# Patient Record
Sex: FEMALE | Race: WHITE | Employment: OTHER | URBAN - METROPOLITAN AREA
[De-identification: names, ages, dates, MRNs, and addresses within clinical notes are randomized per-mention and may not be internally consistent; named-entity substitution may affect disease eponyms.]

---

## 2023-01-03 ENCOUNTER — HOSPITAL ENCOUNTER (OUTPATIENT)
Dept: WOUND CARE | Age: 83
Discharge: HOME | End: 2023-01-03
Attending: PODIATRIST
Payer: MEDICARE

## 2023-01-03 VITALS
SYSTOLIC BLOOD PRESSURE: 147 MMHG | RESPIRATION RATE: 18 BRPM | DIASTOLIC BLOOD PRESSURE: 66 MMHG | HEART RATE: 65 BPM | TEMPERATURE: 98.3 F | OXYGEN SATURATION: 95 %

## 2023-01-03 PROBLEM — L97.929 IDIOPATHIC CHRONIC VENOUS HYPERTENSION OF LEFT LOWER EXTREMITY WITH ULCER (HCC): Status: ACTIVE | Noted: 2023-01-03

## 2023-01-03 PROBLEM — D04.72: Status: ACTIVE | Noted: 2023-01-03

## 2023-01-03 PROBLEM — I87.312 IDIOPATHIC CHRONIC VENOUS HYPERTENSION OF LEFT LOWER EXTREMITY WITH ULCER (HCC): Status: ACTIVE | Noted: 2023-01-03

## 2023-01-03 PROBLEM — T81.31XA WOUND DISRUPTION, POST-OP, SKIN, INITIAL ENCOUNTER: Status: ACTIVE | Noted: 2023-01-03

## 2023-01-03 PROCEDURE — 87075 CULTR BACTERIA EXCEPT BLOOD: CPT

## 2023-01-03 PROCEDURE — 11042 DBRDMT SUBQ TIS 1ST 20SQCM/<: CPT

## 2023-01-03 PROCEDURE — 87186 SC STD MICRODIL/AGAR DIL: CPT

## 2023-01-03 PROCEDURE — 87205 SMEAR GRAM STAIN: CPT

## 2023-01-03 RX ORDER — BACITRACIN ZINC AND POLYMYXIN B SULFATE 500; 1000 [USP'U]/G; [USP'U]/G
OINTMENT TOPICAL ONCE
OUTPATIENT
Start: 2023-01-03 | End: 2023-01-03

## 2023-01-03 RX ORDER — MUPIROCIN 20 MG/G
OINTMENT TOPICAL ONCE
OUTPATIENT
Start: 2023-01-03 | End: 2023-01-03

## 2023-01-03 RX ORDER — LIDOCAINE HYDROCHLORIDE 40 MG/ML
SOLUTION TOPICAL ONCE
OUTPATIENT
Start: 2023-01-03 | End: 2023-01-03

## 2023-01-03 RX ORDER — LIDOCAINE HYDROCHLORIDE 20 MG/ML
JELLY TOPICAL ONCE
OUTPATIENT
Start: 2023-01-03 | End: 2023-01-03

## 2023-01-03 RX ORDER — LIDOCAINE 50 MG/G
OINTMENT TOPICAL ONCE
OUTPATIENT
Start: 2023-01-03 | End: 2023-01-03

## 2023-01-03 RX ORDER — CLOBETASOL PROPIONATE 0.5 MG/G
OINTMENT TOPICAL ONCE
OUTPATIENT
Start: 2023-01-03 | End: 2023-01-03

## 2023-01-03 RX ORDER — LIDOCAINE 40 MG/G
CREAM TOPICAL ONCE
OUTPATIENT
Start: 2023-01-03 | End: 2023-01-03

## 2023-01-03 NOTE — PROGRESS NOTES
[address] [facility]  Initial Consult note    Subjective:     Chief Complaint:  Too Harley is a 79 y.o.  female who presents with Lt. foot dorsal fore foot wound of over three weeks duration. Referred by:  Dr. Mathew Lara, [facility] Dermatology Mohs Surgeon    HPI:   had Mohs' surgery done to remove Squamous cell CA on the 12/12/22  Wound caused by: non healing surgical wound  Current wound care:  Offloading wound: yes and n/a  Appetite: good  Wound associated pain: moderate  Diabetic: no  Smoker: no  ROS: no N/V, no T/chills; no local rash  No past medical history on file. No past surgical history on file. No family history on file. Social History     Tobacco Use    Smoking status: Not on file    Smokeless tobacco: Not on file   Substance Use Topics    Alcohol use: Not on file       Prior to Admission medications    Not on File     Not on File     Review of Systems  Gen: No fever, chills, malaise, weight loss/gain. Derm: see above   Musc/skeletal: no bone or joint complains. Vasc: No edema, cyanosis or claudication. Endo: No heat/cold intolerance, no polyuria,polydipsia or polyphagia. HgbA1c:  Advance Care plan:     Counseling re nutrition not done. Current meds documented in chart  Pain:     Counseling re smoking cessation not done. Blood pressure: noted below     Review of Systems:  Pertinent items are noted in the History of Present Illness. Objective:     Physical Exam:     There were no vitals taken for this visit. General: well developed, well nourished, pleasant , NAD. Hygiene good  Psych: cooperative. Pleasant. No anxiety or depression. Normal mood and affect. Neuro: alert and oriented to person/place/situation. Otherwise nonfocal.  Derm: Skin turgor for age, dry skin  Lower extremities: color normal; temperature normal.Hair growth is not present. Calves are supple, nontender, approximately equally sized in comparison.   Capillary refill <3 sec  Focussed Lower Extremity Exam:  Vascular exam & Nails dystrophic:  Left lower extremity: moderate  edema, foot moderate,   DP pulse : Left, 2+  PT pulse: Left, 1+    Ulcer Location: Lt. foot dorsal forefoot   Etiology: non healing surgical wound  Wound Length:    Wound Width :   Wound Depth :   Ulcer bed: Mixed Granular/Fibrotic  Fibrotic slough  Pink Granulation  Visable Slough  Visible Fat  Visible Fibrin mild infection   Periwound: Reddened, Indurated , Intact   Exudate: Moderate amount Serosanguinous exudate  Depth of Wound: To Fat Layer     Data Review:   No results found for this or any previous visit (from the past 24 hour(s)). Assessment:     Patient Active Problem List   Diagnosis Code    Wound disruption, post-op, skin, initial encounter T81.31XA     79 y.o. female with non healing surgical wound with localized infection grade 2 wound, has been treated with oral abx by Dr. Cecy Morris just finished taking does not remember the name of the abx will tell us next visit    Needs :  Serial debridement- debrided today- see note below  Good local wound care  Edema management  Nutrition optimization  Good Diabetic control    Plan:     In wound care clinic today:  Cleanse wound with NS or soap and water or commercial wound cleanser  Apply the following topically to wound bed:  Apply the following to anurag-wound: NA  Apply the following dressings: Absorptive dressing    For Home Care/Self Care:  Cleanse wound with NS or soap and water or commercial wound cleanser  Keep dressing dry and intact when bathing  Apply the following to wound bed:  Apply the following to skin around wound: NA  Apply the following dressings: Absorptive dressing    Leave dressings in place until next visit    Edema Control:   Elevate legs as much as possible. Avoid standing in one position for more than 10 minutes. Avoid setting with legs down. Do not cross legs while sitting. Off-Loading:     Frequent position changes. Do not cross legs while sitting.  Shift weight every 20 minutes or more when sitting for prolonged periods of time. Patient to return for wound care in:   Days  Follow up with Nurse visit as recommended. PLEASE CONTACT OFFICE AS SOON AS POSSIBLE IF UNABLE TO MAKE THIS APPOINTMENT. Inspect your wounds, looking for signs of infection which may include the following:  Increase in redness  Red \"streaks\" from wound  Increase in swelling  Fever  Unusual odor  Change in the amount of wound drainage. Should you experience any significant changes in your wound(s) or have any questions regarding your home care instructions please contact the wound center or your home health company. If after regular business hours, please call your family doctor or local emergency room. Patient understood and agrees with plan. Questions answered. Signed By: Cele Kovacs DPM     January 3, 2023        Debridement Wound Care        Problem List Items Addressed This Visit    None      Procedure Note  Indications:  Based on my examination of this patient's wound(s)/ulcer(s) today, debridement is required to promote healing and evaluate the wound base. Performed by: Cele Kovacs DPM    Consent obtained: Yes    Time out taken: Yes    Debridement: Excisional    Using curette the wound(s)/ulcer(s) was/were sharply debrided down through and including the removal of    epidermis, dermis, and subcutaneous tissue    Devitalized Tissue Debrided: fibrin, biofilm, slough, and exudate    Pre Debridement Measurements:  Are located in the [facility]  Documentation Flow Sheet    Other: non healing surgical wound    Wound/Ulcer #: 1    Post Debridement Measurements:  Wound/Ulcer Descriptions are Pre Debridement except measurements:           Total Surface Area Debrided:  11 sq cm     Estimated Blood Loss:  Minimal     Hemostasis Achieved: Pressure    Procedural Pain: 0 / 10     Post Procedural Pain: 0 / 10     Response to treatment: Well tolerated by patient     Will treat with compression treatment and obtained c&s to start abx treatment also will get Cathy ba PA

## 2023-01-03 NOTE — WOUND CARE
01/03/23 1659   Wound Foot Left;Dorsal S/P Moh's Procedure 01/03/23   Date First Assessed/Time First Assessed: 01/03/23 1658   Present on Hospital Admission: Yes  Wound Approximate Age at First Assessment (Weeks): 3 weeks  Primary Wound Type: Closed incision/surgical site  Location: Foot  Wound Location Orientation: Lef. .. Wound Image     Wound Etiology Surgical   Dressing Status New dressing applied   Cleansed Wound cleanser   Dressing/Treatment Alginate with Ag;Collagen;Dry dressing;Roll gauze  (2 layer wrap)   Dressing Change Due 01/10/23   Wound Length (cm) 2.1 cm   Wound Width (cm) 5.2 cm   Wound Depth (cm) 0.1 cm   Wound Surface Area (cm^2) 10.92 cm^2   Wound Volume (cm^3) 1.092 cm^3   Post-Procedure Length (cm) 2.2 cm   Post-Procedure Width (cm) 5.2 cm   Post-Procedure Depth (cm) 0.3 cm   Post-Procedure Surface Area (cm^2) 11.44 cm^2   Post-Procedure Volume (cm^3) 3.432 cm^3   Wound Assessment Devitalized tissue   Drainage Amount Scant   Drainage Description Serosanguinous   Wound Odor None   Radha-Wound/Incision Assessment Intact   Edges Defined edges   Wound Thickness Description Full thickness     Multilayer Compression Wrap   (Not Unna) Below the Knee    NAME:  Carmen Oconnor OF BIRTH:  1940  MEDICAL RECORD NUMBER:  [id number]  DATE:  1/3/2023    Removed old Multilayer wrap if indicated and wash leg with mild soap/water. Applied moisturizing agent to dry skin as needed. Applied primary and secondary dressing as ordered. Applied multilayered dressing below the knee to left lower leg. Instructed patient/caregiver not to remove dressing and to keep it clean and dry. Instructed patient/caregiver on complications to report to provider, such as pain, numbness in toes, heavy drainage, and slippage of dressing.     Response to treatment: Well tolerated by patient       Electronically signed by Gareth Cornejo RN on 1/3/2023 at [address] PM

## 2023-01-05 LAB
BACTERIA SPEC CULT: NORMAL
SERVICE CMNT-IMP: NORMAL

## 2023-01-08 LAB
BACTERIA SPEC CULT: ABNORMAL
BACTERIA SPEC CULT: ABNORMAL
GRAM STN SPEC: ABNORMAL
GRAM STN SPEC: ABNORMAL
SERVICE CMNT-IMP: ABNORMAL

## 2023-01-10 ENCOUNTER — HOSPITAL ENCOUNTER (OUTPATIENT)
Dept: WOUND CARE | Age: 83
Discharge: HOME | End: 2023-01-10
Attending: PODIATRIST
Payer: MEDICARE

## 2023-01-10 VITALS
SYSTOLIC BLOOD PRESSURE: 155 MMHG | OXYGEN SATURATION: 98 % | RESPIRATION RATE: 18 BRPM | DIASTOLIC BLOOD PRESSURE: 72 MMHG | TEMPERATURE: 97.4 F | HEART RATE: 77 BPM

## 2023-01-10 DIAGNOSIS — T81.31XA WOUND DISRUPTION, POST-OP, SKIN, INITIAL ENCOUNTER: Primary | ICD-10-CM

## 2023-01-10 DIAGNOSIS — I87.312 IDIOPATHIC CHRONIC VENOUS HYPERTENSION OF LEFT LOWER EXTREMITY WITH ULCER (HCC): ICD-10-CM

## 2023-01-10 DIAGNOSIS — L97.929 IDIOPATHIC CHRONIC VENOUS HYPERTENSION OF LEFT LOWER EXTREMITY WITH ULCER (HCC): ICD-10-CM

## 2023-01-10 PROCEDURE — 11042 DBRDMT SUBQ TIS 1ST 20SQCM/<: CPT

## 2023-01-10 RX ORDER — LIDOCAINE HYDROCHLORIDE 20 MG/ML
JELLY TOPICAL ONCE
OUTPATIENT
Start: 2023-01-10 | End: 2023-01-10

## 2023-01-10 RX ORDER — LIDOCAINE 50 MG/G
OINTMENT TOPICAL ONCE
OUTPATIENT
Start: 2023-01-10 | End: 2023-01-10

## 2023-01-10 RX ORDER — LIDOCAINE HYDROCHLORIDE 40 MG/ML
SOLUTION TOPICAL ONCE
OUTPATIENT
Start: 2023-01-10 | End: 2023-01-10

## 2023-01-10 RX ORDER — LIDOCAINE HYDROCHLORIDE 20 MG/ML
JELLY TOPICAL ONCE
Status: DISCONTINUED | OUTPATIENT
Start: 2023-01-10 | End: 2023-01-11 | Stop reason: HOSPADM

## 2023-01-10 RX ORDER — LIDOCAINE 40 MG/G
CREAM TOPICAL ONCE
OUTPATIENT
Start: 2023-01-10 | End: 2023-01-10

## 2023-01-10 RX ORDER — MUPIROCIN 20 MG/G
OINTMENT TOPICAL ONCE
OUTPATIENT
Start: 2023-01-10 | End: 2023-01-10

## 2023-01-10 RX ORDER — CLOBETASOL PROPIONATE 0.5 MG/G
OINTMENT TOPICAL ONCE
OUTPATIENT
Start: 2023-01-10 | End: 2023-01-10

## 2023-01-10 RX ORDER — BACITRACIN ZINC AND POLYMYXIN B SULFATE 500; 1000 [USP'U]/G; [USP'U]/G
OINTMENT TOPICAL ONCE
OUTPATIENT
Start: 2023-01-10 | End: 2023-01-10

## 2023-01-10 NOTE — PROGRESS NOTES
Debridement Wound Care   Reviewed the c&s report and appreciated clinically no acute infection possible contaminant or surface colonization will defer oral treatment due to the abx choices present which have can have serious side effects at her age, will treat with topical Gentamicin cream for now and will reevaluate next week patient will bring her current meds she is taking if worsens will consider the oral treatment     Problem List Items Addressed This Visit    None      No orders of the defined types were placed in this encounter. Procedure Note  Indications:  Based on my examination of this patient's wound(s)/ulcer(s) today, debridement is required to promote healing and evaluate the wound base.     Performed by: Ancelmo Iqbal DPM    Consent obtained: Yes    Time out taken: Yes    Debridement: Excisional    Using  Curette the wound(s)/ulcer(s) was/were sharply debrided down through and including the removal of    epidermis, dermis, and subcutaneous tissue    Devitalized Tissue Debrided: fibrin, biofilm, and slough    Pre Debridement Measurements:  Are located in the [place]  Documentation Flow Sheet    Wound/Ulcer #: 1    Post Debridement Measurements:  Wound/Ulcer Descriptions are Pre Debridement except measurements: Post-op ulcer    Wound Foot Left;Dorsal S/P Moh's Procedure 01/03/23 (Active)   Wound Image    01/10/23 1440   Wound Etiology Surgical 01/10/23 1440   Dressing Status New dressing applied 01/03/23 1659   Cleansed Wound cleanser 01/03/23 1659   Dressing/Treatment Alginate with Ag;Collagen;Dry dressing;Roll gauze 01/03/23 1659   Dressing Change Due 01/10/23 01/03/23 1659   Wound Length (cm) 2.1 cm 01/10/23 1440   Wound Width (cm) 4.7 cm 01/10/23 1440   Wound Depth (cm) 0.1 cm 01/10/23 1440   Wound Surface Area (cm^2) 9.87 cm^2 01/10/23 1440   Change in Wound Size % 9.62 01/10/23 1440   Wound Volume (cm^3) 0.987 cm^3 01/10/23 1440   Wound Healing % 10 01/10/23 1440   Post-Procedure Length (cm) 2.2 cm 01/03/23 1659   Post-Procedure Width (cm) 5.2 cm 01/03/23 1659   Post-Procedure Depth (cm) 0.3 cm 01/03/23 1659   Post-Procedure Surface Area (cm^2) 11.44 cm^2 01/03/23 1659   Post-Procedure Volume (cm^3) 3.432 cm^3 01/03/23 1659   Wound Assessment Devitalized tissue 01/03/23 1659   Drainage Amount Scant 01/03/23 1659   Drainage Description Serosanguinous 01/03/23 1659   Wound Odor None 01/03/23 1659   Radha-Wound/Incision Assessment Intact 01/03/23 1659   Edges Defined edges 01/03/23 1659   Wound Thickness Description Full thickness 01/03/23 1659   Number of days: 7        Percent of Wound(s)/Ulcer(s) Debrided: 100%    Total Surface Area Debrided:  Approx 10 sq cm See RN's note    Diabetic/Pressure/Non Pressure Ulcers only: Post-op ulcer  Ulcer:     Estimated Blood Loss:  Estimated amt of blood loss is 10 ml    Hemostasis Achieved: Pressure    Procedural Pain: 0 / 10     Post Procedural Pain: 0 / 10     Response to treatment: Well tolerated by patient

## 2023-01-10 NOTE — WOUND CARE
01/10/23 1440   Wound Foot Left;Dorsal S/P Moh's Procedure 01/03/23   Date First Assessed/Time First Assessed: 01/03/23 1658   Present on Hospital Admission: Yes  Wound Approximate Age at First Assessment (Weeks): 3 weeks  Primary Wound Type: Closed incision/surgical site  Location: Foot  Wound Location Orientation: Lef. .. Wound Image     Wound Etiology Surgical   Dressing Status Intact   Cleansed Wound cleanser   Dressing/Treatment Alginate;Dry dressing;Collagen;Roll gauze; Tubular bandage   Dressing Change Due 01/17/23   Wound Length (cm) 2.1 cm   Wound Width (cm) 4.7 cm   Wound Depth (cm) 0.1 cm   Wound Surface Area (cm^2) 9.87 cm^2   Change in Wound Size % 9.62   Wound Volume (cm^3) 0.987 cm^3   Wound Healing % 10   Post-Procedure Length (cm) 2.2 cm   Post-Procedure Width (cm) 4.8 cm   Post-Procedure Depth (cm) 0.2 cm   Post-Procedure Surface Area (cm^2) 10.56 cm^2   Post-Procedure Volume (cm^3) 2.112 cm^3   Wound Assessment Devitalized tissue   Drainage Amount Small   Drainage Description Serosanguinous   Wound Odor None   Radha-Wound/Incision Assessment Intact   Edges Defined edges   Wound Thickness Description Full thickness

## 2023-01-11 NOTE — DISCHARGE INSTRUCTIONS
Discharge Instructions from  [address] [facility]  [address] [place], [facility]  [phone] Fax [phone]    NAME:  Edith Kulkarni  YOB: 1940  MEDICAL RECORD NUMBER:  [id number]  DATE:  1/10/2023    Wound Cleansing:   Do not scrub or use excessive force. Cleanse wound prior to applying a clean dressing with:  [] Normal Saline [] Keep Wound Dry in Shower    [] Wound Cleanser   [] Cleanse wound with Mild Soap & Water  [] May Shower at Discharge   [] Other:      Topical Treatments:  Do not apply lotions, creams, or ointments to wound bed unless directed. [] Apply moisturizing lotion to skin surrounding the wound prior to dressing change.  [] Apply antifungal ointment to skin surrounding the wound prior to dressing change.  [] Apply thin film of moisture barrier ointment to skin immediately around wound. [] Other:       Dressings:           Wound Location Left foot   [] Apply Primary Dressing:       [] MediHoney Gel [] Alginate with Silver [] Alginate   [] Collagen [] Collagen with Silver   [] Santyl with Moisten saline gauze     [] Hydrocolloid   [] MediHoney Alginate [] Foam with Silver   [] Foam   [] Hydrofera Blue    [] Mepilex Border    [] Moisten with Saline [] Hydrogel [] Mepitel     [] Bactroban/Mupirocin [] Polysporin  [] Other:    [] Pack wound loosely with  [] Iodoform   [] Plain Packing  [] Other   [] Cover and Secure with:     [] Gauze [] Vargas [] Kerlix   [] Ace Wrap [] Cover Roll Tape [] ABD     [] Other:    Avoid contact of tape with skin.   [] Change dressing: [] Daily    [] Every Other Day [] Three times per week   [] Once a week [x] Do Not Change Dressing   [] Other:            Edema Control:  Apply: [] Compression Stocking []Right Leg []Left Leg   [x] Tubigrip []Right Leg Double Layer []Left Leg Double Layer       []Right Leg Single Layer [x]Left Leg Single Layer   [] SpandaGrip []Right Leg  []Left Leg      []Low compression 5-10 mm/Hg      []Medium compression 10-20 mm/Hg     []High compression  20-30 mm/Hg   every morning immediately when getting up should be applied to affected leg(s) from mid foot to knee making sure to cover the heel. Remove every night before going to bed. [x] Elevate leg(s) above the level of the heart when sitting. [x] Avoid prolonged standing in one place. Dietary:  [x] Diet as tolerated: [] Calorie Diabetic Diet: [] No Added Salt:  [] Increase Protein: [] Other:       Activity:  [x] Activity as tolerated:  [] Patient has no activity restrictions     [] Strict Bedrest: [] Remain off Work:     [] May return to full duty work:                                   [] Return to work with restrictions:             Return Appointment:  [] Wound and dressing supply provider:   [] ECF or Home Healthcare:  [] Wound Assessment: [] Physician or NP scheduled for Wound Assessment:   [x] Return Appointment: With Dr. Donta Rivera  in [address] [place] [address])  [] Ordered tests:      Electronically signed Justin Hauser RN on 1/10/2023 at [address] [place] [address] Dr: Should you experience any significant changes in your wound(s) or have questions about your wound care, please contact the [address] [place] [address] at [address] [place] [address] 8:00 am - 4:30. If you need help with your wound outside these hours and cannot wait until we are again available, contact your PCP or go to the hospital emergency room. PLEASE NOTE: IF YOU ARE UNABLE TO OBTAIN WOUND SUPPLIES, CONTINUE TO USE THE SUPPLIES YOU HAVE AVAILABLE UNTIL YOU ARE ABLE TO REACH US. IT IS MOST IMPORTANT TO KEEP THE WOUND COVERED AT ALL TIMES.      Physician Signature:_______________________    Date: ___________ Time:  ____________

## 2023-01-17 ENCOUNTER — HOSPITAL ENCOUNTER (OUTPATIENT)
Dept: WOUND CARE | Age: 83
Discharge: HOME | End: 2023-01-17
Attending: PODIATRIST
Payer: MEDICARE

## 2023-01-17 VITALS
SYSTOLIC BLOOD PRESSURE: 154 MMHG | RESPIRATION RATE: 18 BRPM | TEMPERATURE: 97.7 F | DIASTOLIC BLOOD PRESSURE: 67 MMHG | HEART RATE: 71 BPM | OXYGEN SATURATION: 98 %

## 2023-01-17 DIAGNOSIS — I87.312 IDIOPATHIC CHRONIC VENOUS HYPERTENSION OF LEFT LOWER EXTREMITY WITH ULCER (HCC): ICD-10-CM

## 2023-01-17 DIAGNOSIS — T81.31XA WOUND DISRUPTION, POST-OP, SKIN, INITIAL ENCOUNTER: Primary | ICD-10-CM

## 2023-01-17 DIAGNOSIS — L97.929 IDIOPATHIC CHRONIC VENOUS HYPERTENSION OF LEFT LOWER EXTREMITY WITH ULCER (HCC): ICD-10-CM

## 2023-01-17 PROCEDURE — 11042 DBRDMT SUBQ TIS 1ST 20SQCM/<: CPT

## 2023-01-17 RX ORDER — MUPIROCIN 20 MG/G
OINTMENT TOPICAL ONCE
OUTPATIENT
Start: 2023-01-17 | End: 2023-01-17

## 2023-01-17 RX ORDER — CLOBETASOL PROPIONATE 0.5 MG/G
OINTMENT TOPICAL ONCE
OUTPATIENT
Start: 2023-01-17 | End: 2023-01-17

## 2023-01-17 RX ORDER — BACITRACIN ZINC AND POLYMYXIN B SULFATE 500; 1000 [USP'U]/G; [USP'U]/G
OINTMENT TOPICAL ONCE
OUTPATIENT
Start: 2023-01-17 | End: 2023-01-17

## 2023-01-17 RX ORDER — LIDOCAINE HYDROCHLORIDE 20 MG/ML
JELLY TOPICAL ONCE
Status: COMPLETED | OUTPATIENT
Start: 2023-01-17 | End: 2023-01-17

## 2023-01-17 RX ORDER — PANTOPRAZOLE SODIUM 40 MG/1
40 TABLET, DELAYED RELEASE ORAL 2 TIMES DAILY
COMMUNITY

## 2023-01-17 RX ORDER — LIDOCAINE HYDROCHLORIDE 20 MG/ML
JELLY TOPICAL ONCE
OUTPATIENT
Start: 2023-01-17 | End: 2023-01-17

## 2023-01-17 RX ORDER — ESCITALOPRAM OXALATE 10 MG/1
10 TABLET ORAL DAILY
COMMUNITY

## 2023-01-17 RX ORDER — LOSARTAN POTASSIUM 100 MG/1
100 TABLET ORAL DAILY
COMMUNITY

## 2023-01-17 RX ORDER — FUROSEMIDE 40 MG/1
40 TABLET ORAL DAILY
COMMUNITY

## 2023-01-17 RX ORDER — LIDOCAINE 50 MG/G
OINTMENT TOPICAL ONCE
OUTPATIENT
Start: 2023-01-17 | End: 2023-01-17

## 2023-01-17 RX ORDER — LATANOPROST 50 UG/ML
1 SOLUTION/ DROPS OPHTHALMIC
COMMUNITY

## 2023-01-17 RX ORDER — CARVEDILOL 3.12 MG/1
3.12 TABLET ORAL 2 TIMES DAILY WITH MEALS
COMMUNITY

## 2023-01-17 RX ORDER — PRAMIPEXOLE DIHYDROCHLORIDE 0.12 MG/1
0.12 TABLET ORAL DAILY
COMMUNITY

## 2023-01-17 RX ORDER — LIDOCAINE HYDROCHLORIDE 40 MG/ML
SOLUTION TOPICAL ONCE
OUTPATIENT
Start: 2023-01-17 | End: 2023-01-17

## 2023-01-17 RX ORDER — AMLODIPINE BESYLATE 5 MG/1
5 TABLET ORAL DAILY
COMMUNITY

## 2023-01-17 RX ORDER — LIDOCAINE 40 MG/G
CREAM TOPICAL ONCE
OUTPATIENT
Start: 2023-01-17 | End: 2023-01-17

## 2023-01-17 RX ORDER — ATORVASTATIN CALCIUM 40 MG/1
40 TABLET, FILM COATED ORAL DAILY
COMMUNITY

## 2023-01-17 RX ORDER — GABAPENTIN 300 MG/1
300 CAPSULE ORAL DAILY
COMMUNITY

## 2023-01-17 RX ORDER — HYDROCODONE BITARTRATE AND ACETAMINOPHEN 5; 325 MG/1; MG/1
TABLET ORAL
COMMUNITY

## 2023-01-17 RX ORDER — ASPIRIN 81 MG/1
81 TABLET ORAL DAILY
COMMUNITY

## 2023-01-17 RX ADMIN — LIDOCAINE HYDROCHLORIDE: 20 JELLY TOPICAL at 13:45

## 2023-01-17 NOTE — PROGRESS NOTES
Debridement Wound Care        Problem List Items Addressed This Visit    None      No orders of the defined types were placed in this encounter. Procedure Note  Indications:  Based on my examination of this patient's wound(s)/ulcer(s) today, debridement is required to promote healing and evaluate the wound base. Performed by: Gerard Espino DPM    Consent obtained: Yes    Time out taken: Yes    Debridement: Excisional    Using  Curette the wound(s)/ulcer(s) was/were sharply debrided down through and including the removal of    epidermis, dermis, and subcutaneous tissue    Devitalized Tissue Debrided: fibrin, biofilm, and slough    Pre Debridement Measurements:  Are located in the [place]  Documentation Flow Sheet    Wound/Ulcer #: 1    Post Debridement Measurements:  Wound/Ulcer Descriptions are Pre Debridement except measurements: Post-op ulcer    Wound Foot Left;Dorsal S/P Moh's Procedure 01/03/23 (Active)   Wound Image   01/17/23 1345   Wound Etiology Surgical 01/17/23 1345   Dressing Status Intact 01/17/23 1345   Cleansed Wound cleanser 01/17/23 1345   Dressing/Treatment Alginate;Dry dressing;Collagen;Roll gauze; Tubular bandage 01/10/23 1440   Dressing Change Due 01/24/23 01/17/23 1345   Wound Length (cm) 2.1 cm 01/17/23 1345   Wound Width (cm) 4 cm 01/17/23 1345   Wound Depth (cm) 0.1 cm 01/17/23 1345   Wound Surface Area (cm^2) 8.4 cm^2 01/17/23 1345   Change in Wound Size % 23.08 01/17/23 1345   Wound Volume (cm^3) 0.84 cm^3 01/17/23 1345   Wound Healing % 23 01/17/23 1345   Post-Procedure Length (cm) 2.2 cm 01/10/23 1440   Post-Procedure Width (cm) 4.8 cm 01/10/23 1440   Post-Procedure Depth (cm) 0.2 cm 01/10/23 1440   Post-Procedure Surface Area (cm^2) 10.56 cm^2 01/10/23 1440   Post-Procedure Volume (cm^3) 2.112 cm^3 01/10/23 1440   Wound Assessment Devitalized tissue 01/17/23 1345   Drainage Amount Small 01/17/23 1345   Drainage Description Serosanguinous 01/17/23 1345   Wound Odor None 01/17/23 1345   Radha-Wound/Incision Assessment Intact 01/17/23 1345   Edges Defined edges 01/17/23 1345   Wound Thickness Description Full thickness 01/17/23 1345   Number of days: 14        Percent of Wound(s)/Ulcer(s) Debrided: 100%    Total Surface Area Debrided:  Approx 10 sq cm See RN's note    Diabetic/Pressure/Non Pressure Ulcers only: Post-op ulcer  Ulcer:     Estimated Blood Loss:  Minimal     Hemostasis Achieved: Pressure    Procedural Pain: 0 / 10     Post Procedural Pain: 0 / 10     Response to treatment: Well tolerated by patient     Chemical Cautery    Performed by: Carlos Avendano DPM    Consent obtained? Yes     Time out taken: Yes    Tissue: Hypergranulation    Method: Silver Nitrate    Location of Chemical Cautery:     Wound/Ulcer Number: 1     Wound Foot Left;Dorsal S/P Moh's Procedure 01/03/23 (Active)   Wound Image   01/17/23 1345   Wound Etiology Surgical 01/17/23 1345   Dressing Status Intact 01/17/23 1345   Cleansed Wound cleanser 01/17/23 1345   Dressing/Treatment Alginate;Dry dressing;Collagen;Roll gauze; Tubular bandage 01/10/23 1440   Dressing Change Due 01/24/23 01/17/23 1345   Wound Length (cm) 2.1 cm 01/17/23 1345   Wound Width (cm) 4 cm 01/17/23 1345   Wound Depth (cm) 0.1 cm 01/17/23 1345   Wound Surface Area (cm^2) 8.4 cm^2 01/17/23 1345   Change in Wound Size % 23.08 01/17/23 1345   Wound Volume (cm^3) 0.84 cm^3 01/17/23 1345   Wound Healing % 23 01/17/23 1345   Post-Procedure Length (cm) 2.2 cm 01/10/23 1440   Post-Procedure Width (cm) 4.8 cm 01/10/23 1440   Post-Procedure Depth (cm) 0.2 cm 01/10/23 1440   Post-Procedure Surface Area (cm^2) 10.56 cm^2 01/10/23 1440   Post-Procedure Volume (cm^3) 2.112 cm^3 01/10/23 1440   Wound Assessment Devitalized tissue 01/17/23 1345   Drainage Amount Small 01/17/23 1345   Drainage Description Serosanguinous 01/17/23 1345   Wound Odor None 01/17/23 1345   Radha-Wound/Incision Assessment Intact 01/17/23 1345   Edges Defined edges 01/17/23 1345 Wound Thickness Description Full thickness 01/17/23 1347   Number of days: 14        Procedural Pain: 0 / 10     Post Procedural Pain: 0 / 10     Response to treatment: Well tolerated by patient

## 2023-01-18 RX ORDER — GLUCOSAMINE SULFATE 1500 MG
2000 POWDER IN PACKET (EA) ORAL DAILY
COMMUNITY

## 2023-01-18 RX ORDER — LANOLIN ALCOHOL/MO/W.PET/CERES
1000 CREAM (GRAM) TOPICAL DAILY
COMMUNITY

## 2023-01-18 NOTE — DISCHARGE INSTRUCTIONS
Discharge Instructions from  [address] [facility]  [address] [place], [facility]0 [facility]  [phone] Fax [phone]    NAME:  Nanette Nelson  YOB: 1940  MEDICAL RECORD NUMBER:  [id number]  DATE:  1/17/2023    Wound Cleansing:   Do not scrub or use excessive force. Cleanse wound prior to applying a clean dressing with:  [] Normal Saline [] Keep Wound Dry in Shower    [] Wound Cleanser   [] Cleanse wound with Mild Soap & Water  [] May Shower at Discharge   [] Other:      Topical Treatments:  Do not apply lotions, creams, or ointments to wound bed unless directed. [] Apply moisturizing lotion to skin surrounding the wound prior to dressing change.  [] Apply antifungal ointment to skin surrounding the wound prior to dressing change.  [] Apply thin film of moisture barrier ointment to skin immediately around wound. [] Other:       Dressings:           Wound Location Left foot   [] Apply Primary Dressing:       [] MediHoney Gel [] Alginate with Silver [] Alginate   [] Collagen [] Collagen with Silver   [] Santyl with Moisten saline gauze     [] Hydrocolloid   [] MediHoney Alginate [] Foam with Silver   [] Foam   [] Hydrofera Blue    [] Mepilex Border    [] Moisten with Saline [] Hydrogel [] Mepitel     [] Bactroban/Mupirocin [] Polysporin  [] Other:    [] Pack wound loosely with  [] Iodoform   [] Plain Packing  [] Other   [] Cover and Secure with:     [] Gauze [] Vargas [] Kerlix   [] Ace Wrap [] Cover Roll Tape [] ABD     [] Other:    Avoid contact of tape with skin.   [] Change dressing: [] Daily    [] Every Other Day [] Three times per week   [] Once a week [x] Do Not Change Dressing   [] Other:            Edema Control:  Apply: [] Compression Stocking []Right Leg []Left Leg   [x] Tubigrip []Right Leg Double Layer []Left Leg Double Layer       [x]Right Leg Single Layer []Left Leg Single Layer   [] SpandaGrip []Right Leg  []Left Leg      []Low compression 5-10 mm/Hg      []Medium compression 10-20 mm/Hg     []High compression  20-30 mm/Hg   every morning immediately when getting up should be applied to affected leg(s) from mid foot to knee making sure to cover the heel. Remove every night before going to bed. [x] Elevate leg(s) above the level of the heart when sitting. [x] Avoid prolonged standing in one place. Dietary:  [x] Diet as tolerated: [] Calorie Diabetic Diet: [] No Added Salt:  [] Increase Protein: [] Other:       Activity:  [x] Activity as tolerated:  [] Patient has no activity restrictions     [] Strict Bedrest: [] Remain off Work:     [] May return to full duty work:                                   [] Return to work with restrictions:             Return Appointment:  [] Wound and dressing supply provider:   [] ECF or Home Healthcare:  [] Wound Assessment: [] Physician or NP scheduled for Wound Assessment:   [x] Return Appointment: With Dr. Abran Andres  in  [facility])  [] Ordered tests:      Electronically signed Reji Shane RN on 1/17/2023 at 2:30 PM    Perla Costa 281: Should you experience any significant changes in your wound(s) or have questions about your wound care, please contact the [facility][note 3] Main at [address] [place] [address] 8:00 am - 4:30. If you need help with your wound outside these hours and cannot wait until we are again available, contact your PCP or go to the hospital emergency room. PLEASE NOTE: IF YOU ARE UNABLE TO OBTAIN WOUND SUPPLIES, CONTINUE TO USE THE SUPPLIES YOU HAVE AVAILABLE UNTIL YOU ARE ABLE TO REACH US. IT IS MOST IMPORTANT TO KEEP THE WOUND COVERED AT ALL TIMES.      Physician Signature:_______________________    Date: ___________ Time:  ____________

## 2023-01-24 ENCOUNTER — HOSPITAL ENCOUNTER (OUTPATIENT)
Dept: WOUND CARE | Age: 83
Discharge: HOME | End: 2023-01-24
Attending: PODIATRIST
Payer: MEDICARE

## 2023-01-24 VITALS
RESPIRATION RATE: 18 BRPM | HEART RATE: 68 BPM | TEMPERATURE: 97.5 F | SYSTOLIC BLOOD PRESSURE: 130 MMHG | DIASTOLIC BLOOD PRESSURE: 71 MMHG | OXYGEN SATURATION: 100 %

## 2023-01-24 DIAGNOSIS — T81.31XA WOUND DISRUPTION, POST-OP, SKIN, INITIAL ENCOUNTER: Primary | ICD-10-CM

## 2023-01-24 DIAGNOSIS — I87.312 IDIOPATHIC CHRONIC VENOUS HYPERTENSION OF LEFT LOWER EXTREMITY WITH ULCER (HCC): ICD-10-CM

## 2023-01-24 DIAGNOSIS — L97.929 IDIOPATHIC CHRONIC VENOUS HYPERTENSION OF LEFT LOWER EXTREMITY WITH ULCER (HCC): ICD-10-CM

## 2023-01-24 PROCEDURE — 11042 DBRDMT SUBQ TIS 1ST 20SQCM/<: CPT

## 2023-01-24 RX ORDER — LIDOCAINE 50 MG/G
OINTMENT TOPICAL ONCE
OUTPATIENT
Start: 2023-01-24 | End: 2023-01-24

## 2023-01-24 RX ORDER — LIDOCAINE 40 MG/G
CREAM TOPICAL ONCE
OUTPATIENT
Start: 2023-01-24 | End: 2023-01-24

## 2023-01-24 RX ORDER — LIDOCAINE HYDROCHLORIDE 20 MG/ML
JELLY TOPICAL ONCE
OUTPATIENT
Start: 2023-01-24 | End: 2023-01-24

## 2023-01-24 RX ORDER — LIDOCAINE HYDROCHLORIDE 40 MG/ML
SOLUTION TOPICAL ONCE
OUTPATIENT
Start: 2023-01-24 | End: 2023-01-24

## 2023-01-24 RX ORDER — MUPIROCIN 20 MG/G
OINTMENT TOPICAL ONCE
OUTPATIENT
Start: 2023-01-24 | End: 2023-01-24

## 2023-01-24 RX ORDER — LIDOCAINE HYDROCHLORIDE 20 MG/ML
JELLY TOPICAL ONCE
Status: COMPLETED | OUTPATIENT
Start: 2023-01-24 | End: 2023-01-24

## 2023-01-24 RX ORDER — CLOBETASOL PROPIONATE 0.5 MG/G
OINTMENT TOPICAL ONCE
OUTPATIENT
Start: 2023-01-24 | End: 2023-01-24

## 2023-01-24 RX ORDER — BACITRACIN ZINC AND POLYMYXIN B SULFATE 500; 1000 [USP'U]/G; [USP'U]/G
OINTMENT TOPICAL ONCE
OUTPATIENT
Start: 2023-01-24 | End: 2023-01-24

## 2023-01-24 RX ADMIN — LIDOCAINE HYDROCHLORIDE: 20 JELLY TOPICAL at 13:45

## 2023-01-24 NOTE — WOUND CARE
01/24/23 1346   Wound Foot Left;Dorsal S/P Moh's Procedure 01/03/23   Date First Assessed/Time First Assessed: 01/03/23 1658   Present on Hospital Admission: Yes  Wound Approximate Age at First Assessment (Weeks): 3 weeks  Primary Wound Type: Closed incision/surgical site  Location: Foot  Wound Location Orientation: Lef. .. Wound Image     Wound Etiology Surgical   Dressing Status Intact;Breakthrough drainage noted   Cleansed Wound cleanser   Dressing/Treatment Alginate;Dry dressing;Roll gauze; Tubular bandage   Offloading for Diabetic Foot Ulcers Post-op shoe   Dressing Change Due 01/31/23   Wound Length (cm) 1.6 cm   Wound Width (cm) 3.8 cm   Wound Depth (cm) 0.1 cm   Wound Surface Area (cm^2) 6.08 cm^2   Change in Wound Size % 44.32   Wound Volume (cm^3) 0.608 cm^3   Wound Healing % 44   Post-Procedure Length (cm) 1.6 cm   Post-Procedure Width (cm) 3.8 cm   Post-Procedure Depth (cm) 0.2 cm   Post-Procedure Surface Area (cm^2) 6.08 cm^2   Post-Procedure Volume (cm^3) 1.216 cm^3   Wound Assessment Hyper granulation tissue   Drainage Amount Moderate   Drainage Description Serosanguinous   Wound Odor None   Radha-Wound/Incision Assessment Intact   Edges Defined edges   Wound Thickness Description Full thickness     Tubigrip size E

## 2023-01-27 NOTE — DISCHARGE INSTRUCTIONS
Discharge Instructions from  [facility]  [address], [facility]0 [facility]  [phone] Fax [phone]    NAME:  Krystyna Olvera  YOB: 1940  MEDICAL RECORD NUMBER:  [id number]  DATE: 1/24/2023    Wound Cleansing:   Do not scrub or use excessive force. Cleanse wound prior to applying a clean dressing with:  [] Normal Saline [] Keep Wound Dry in Shower    [] Wound Cleanser   [] Cleanse wound with Mild Soap & Water  [] May Shower at Discharge   [] Other:      Topical Treatments:  Do not apply lotions, creams, or ointments to wound bed unless directed. [] Apply moisturizing lotion to skin surrounding the wound prior to dressing change.  [] Apply antifungal ointment to skin surrounding the wound prior to dressing change.  [] Apply thin film of moisture barrier ointment to skin immediately around wound. [] Other:       Dressings:           Wound Location left foot   [] Apply Primary Dressing:       [] MediHoney Gel [] Alginate with Silver [] Alginate   [] Collagen [] Collagen with Silver   [] Santyl with Moisten saline gauze     [] Hydrocolloid   [] MediHoney Alginate [] Foam with Silver   [] Foam   [] Hydrofera Blue    [] Mepilex Border    [] Moisten with Saline [] Hydrogel [] Mepitel     [] Bactroban/Mupirocin [] Polysporin  [] Other:    [] Pack wound loosely with  [] Iodoform   [] Plain Packing  [] Other   [] Cover and Secure with:     [] Gauze [] Vargas [] Kerlix   [] Ace Wrap [] Cover Roll Tape [] ABD     [] Other:    Avoid contact of tape with skin.   [] Change dressing: [] Daily    [] Every Other Day [] Three times per week   [] Once a week [x] Do Not Change Dressing   [] Other:          Edema Control:  Apply: [] Compression Stocking []Right Leg []Left Leg   [x] Tubigrip []Right Leg Double Layer []Left Leg Double Layer       []Right Leg Single Layer [x]Left Leg Single Layer   [] SpandaGrip []Right Leg  []Left Leg      []Low compression 5-10 mm/Hg      []Medium compression 10-20 mm/Hg     []High compression  20-30 mm/Hg   every morning immediately when getting up should be applied to affected leg(s) from mid foot to knee making sure to cover the heel. Remove every night before going to bed. [x] Elevate leg(s) above the level of the heart when sitting. [x] Avoid prolonged standing in one place. Dietary:  [x] Diet as tolerated: [] Calorie Diabetic Diet: [] No Added Salt:  [] Increase Protein: [] Other:         Activity:  [x] Activity as tolerated:  [] Patient has no activity restrictions     [] Strict Bedrest: [] Remain off Work:     [] May return to full duty work:                                   [] Return to work with restrictions:             Return Appointment:  [] Wound and dressing supply provider:   [] ECF or Home Healthcare:  [] Wound Assessment: [] Physician or NP scheduled for Wound Assessment:   [x] Return Appointment: With Dr. Jeanie Nguyen  in  [facility])  [] Ordered tests:      Electronically signed Kevin Mills RN on 1/24/2023 at 2:30 PM    Perla Costa 281: Should you experience any significant changes in your wound(s) or have questions about your wound care, please contact the [facility] Main at [address] [place] [address] 8:00 am - 4:30. If you need help with your wound outside these hours and cannot wait until we are again available, contact your PCP or go to the hospital emergency room. PLEASE NOTE: IF YOU ARE UNABLE TO OBTAIN WOUND SUPPLIES, CONTINUE TO USE THE SUPPLIES YOU HAVE AVAILABLE UNTIL YOU ARE ABLE TO REACH US. IT IS MOST IMPORTANT TO KEEP THE WOUND COVERED AT ALL TIMES.      Physician Signature:_______________________    Date: ___________ Time:  ____________

## 2023-01-31 ENCOUNTER — HOSPITAL ENCOUNTER (OUTPATIENT)
Dept: WOUND CARE | Age: 83
Discharge: HOME | End: 2023-01-31
Attending: PODIATRIST
Payer: MEDICARE

## 2023-01-31 DIAGNOSIS — L97.929 IDIOPATHIC CHRONIC VENOUS HYPERTENSION OF LEFT LOWER EXTREMITY WITH ULCER (HCC): ICD-10-CM

## 2023-01-31 DIAGNOSIS — T81.31XA WOUND DISRUPTION, POST-OP, SKIN, INITIAL ENCOUNTER: Primary | ICD-10-CM

## 2023-01-31 DIAGNOSIS — I87.312 IDIOPATHIC CHRONIC VENOUS HYPERTENSION OF LEFT LOWER EXTREMITY WITH ULCER (HCC): ICD-10-CM

## 2023-01-31 PROCEDURE — 11042 DBRDMT SUBQ TIS 1ST 20SQCM/<: CPT

## 2023-01-31 RX ORDER — LIDOCAINE 50 MG/G
OINTMENT TOPICAL ONCE
OUTPATIENT
Start: 2023-01-31 | End: 2023-01-31

## 2023-01-31 RX ORDER — MUPIROCIN 20 MG/G
OINTMENT TOPICAL ONCE
OUTPATIENT
Start: 2023-01-31 | End: 2023-01-31

## 2023-01-31 RX ORDER — LIDOCAINE HYDROCHLORIDE 20 MG/ML
JELLY TOPICAL ONCE
OUTPATIENT
Start: 2023-01-31 | End: 2023-01-31

## 2023-01-31 RX ORDER — LIDOCAINE 40 MG/G
CREAM TOPICAL ONCE
OUTPATIENT
Start: 2023-01-31 | End: 2023-01-31

## 2023-01-31 RX ORDER — LIDOCAINE HYDROCHLORIDE 40 MG/ML
SOLUTION TOPICAL ONCE
OUTPATIENT
Start: 2023-01-31 | End: 2023-01-31

## 2023-01-31 RX ORDER — LIDOCAINE HYDROCHLORIDE 20 MG/ML
JELLY TOPICAL ONCE
Status: COMPLETED | OUTPATIENT
Start: 2023-01-31 | End: 2023-01-31

## 2023-01-31 RX ORDER — BACITRACIN ZINC AND POLYMYXIN B SULFATE 500; 1000 [USP'U]/G; [USP'U]/G
OINTMENT TOPICAL ONCE
OUTPATIENT
Start: 2023-01-31 | End: 2023-01-31

## 2023-01-31 RX ORDER — CLOBETASOL PROPIONATE 0.5 MG/G
OINTMENT TOPICAL ONCE
OUTPATIENT
Start: 2023-01-31 | End: 2023-01-31

## 2023-01-31 RX ADMIN — LIDOCAINE HYDROCHLORIDE: 20 JELLY TOPICAL at 13:40

## 2023-01-31 NOTE — PROGRESS NOTES
Debridement Wound Care        Problem List Items Addressed This Visit    None      No orders of the defined types were placed in this encounter. Procedure Note  Indications:  Based on my examination of this patient's wound(s)/ulcer(s) today, debridement is required to promote healing and evaluate the wound base. Performed by: Jaime Swan DPM    Consent obtained: Yes    Time out taken: Yes    Debridement: Excisional    Using  Curette the wound(s)/ulcer(s) was/were sharply debrided down through and including the removal of    epidermis, dermis, and subcutaneous tissue    Devitalized Tissue Debrided: fibrin, biofilm, and slough    Pre Debridement Measurements:  Are located in the [facility]  Documentation Flow Sheet    Wound/Ulcer #: 1    Post Debridement Measurements:  Wound/Ulcer Descriptions are Pre Debridement except measurements: Post-op ulcer    Wound Foot Left;Dorsal S/P Moh's Procedure 01/03/23 (Active)   Wound Image   01/31/23 1343   Wound Etiology Surgical 01/31/23 1343   Dressing Status Intact;Breakthrough drainage noted 01/24/23 1346   Cleansed Wound cleanser 01/24/23 1346   Dressing/Treatment Alginate;Dry dressing;Roll gauze; Tubular bandage 01/24/23 1346   Offloading for Diabetic Foot Ulcers Post-op shoe 01/24/23 1346   Dressing Change Due 01/31/23 01/24/23 1346   Wound Length (cm) 1.2 cm 01/31/23 1343   Wound Width (cm) 3 cm 01/31/23 1343   Wound Depth (cm) 0.1 cm 01/31/23 1343   Wound Surface Area (cm^2) 3.6 cm^2 01/31/23 1343   Change in Wound Size % 67.03 01/31/23 1343   Wound Volume (cm^3) 0.36 cm^3 01/31/23 1343   Wound Healing % 67 01/31/23 1343   Post-Procedure Length (cm) 1.6 cm 01/24/23 1346   Post-Procedure Width (cm) 3.8 cm 01/24/23 1346   Post-Procedure Depth (cm) 0.2 cm 01/24/23 1346   Post-Procedure Surface Area (cm^2) 6.08 cm^2 01/24/23 1346   Post-Procedure Volume (cm^3) 1.216 cm^3 01/24/23 1346   Wound Assessment Hyper granulation tissue 01/24/23 1346   Drainage Amount Moderate 01/24/23 1346   Drainage Description Serosanguinous 01/24/23 1346   Wound Odor None 01/24/23 1346   Radha-Wound/Incision Assessment Intact 01/24/23 1346   Edges Defined edges 01/24/23 1346   Wound Thickness Description Full thickness 01/24/23 1346   Number of days: 28        Percent of Wound(s)/Ulcer(s) Debrided: 100%    Total Surface Area Debrided:  Approx 4 sq cm See RN's note    Diabetic/Pressure/Non Pressure Ulcers only: Post-op ulcer  Ulcer:     Estimated Blood Loss:  Minimal     Hemostasis Achieved: Pressure    Procedural Pain: 0 / 10     Post Procedural Pain: 0 / 10     Response to treatment: Well tolerated by patient

## 2023-02-01 VITALS
DIASTOLIC BLOOD PRESSURE: 66 MMHG | SYSTOLIC BLOOD PRESSURE: 140 MMHG | TEMPERATURE: 97.5 F | RESPIRATION RATE: 18 BRPM | OXYGEN SATURATION: 100 % | HEART RATE: 67 BPM

## 2023-02-01 NOTE — WOUND CARE
01/31/23 1343   Wound Foot Left;Dorsal S/P Moh's Procedure 01/03/23   Date First Assessed/Time First Assessed: 01/03/23 1658   Present on Hospital Admission: Yes  Wound Approximate Age at First Assessment (Weeks): 3 weeks  Primary Wound Type: Closed incision/surgical site  Location: Foot  Wound Location Orientation: Lef. .. Wound Image     Wound Etiology Surgical   Dressing Status Intact   Cleansed Wound cleanser   Dressing/Treatment Alginate;Dry dressing;Collagen with Ag;Silicone border;Tubular bandage   Wound Length (cm) 1.2 cm   Wound Width (cm) 3 cm   Wound Depth (cm) 0.1 cm   Wound Surface Area (cm^2) 3.6 cm^2   Change in Wound Size % 67.03   Wound Volume (cm^3) 0.36 cm^3   Wound Healing % 67   Post-Procedure Length (cm) 1.2 cm   Post-Procedure Width (cm) 3 cm   Post-Procedure Depth (cm) 0.2 cm   Post-Procedure Surface Area (cm^2) 3.6 cm^2   Post-Procedure Volume (cm^3) 0.72 cm^3   Wound Assessment Hyper granulation tissue; Devitalized tissue   Drainage Amount Small   Drainage Description Serosanguinous   Wound Odor None   Radha-Wound/Incision Assessment Intact   Edges Defined edges   Wound Thickness Description Full thickness     Wound silver nitrated by Dr. Geovanna Singleton size F applied

## 2023-02-01 NOTE — DISCHARGE INSTRUCTIONS
Discharge Instructions from  [address] [facility]  [address], [facility]0 [facility]  [phone] Fax [phone]    NAME:  Markel Ny  YOB: 1940  MEDICAL RECORD NUMBER:  [id number]  DATE:  1/31/2023    Wound Cleansing:   Do not scrub or use excessive force. Cleanse wound prior to applying a clean dressing with:  [] Normal Saline [] Keep Wound Dry in Shower    [] Wound Cleanser   [] Cleanse wound with Mild Soap & Water  [] May Shower at Discharge   [] Other:      Topical Treatments:  Do not apply lotions, creams, or ointments to wound bed unless directed. [] Apply moisturizing lotion to skin surrounding the wound prior to dressing change.  [] Apply antifungal ointment to skin surrounding the wound prior to dressing change.  [] Apply thin film of moisture barrier ointment to skin immediately around wound. [] Other:       Dressings:           Wound Location Left foot   [] Apply Primary Dressing:       [] MediHoney Gel [] Alginate with Silver [] Alginate   [] Collagen [] Collagen with Silver   [] Santyl with Moisten saline gauze     [] Hydrocolloid   [] MediHoney Alginate [] Foam with Silver   [] Foam   [] Hydrofera Blue    [] Mepilex Border    [] Moisten with Saline [] Hydrogel [] Mepitel     [] Bactroban/Mupirocin [] Polysporin  [] Other:    [] Pack wound loosely with  [] Iodoform   [] Plain Packing  [] Other   [] Cover and Secure with:     [] Gauze [] Vargas [] Kerlix   [] Ace Wrap [] Cover Roll Tape [] ABD     [] Other:    Avoid contact of tape with skin.   [] Change dressing: [] Daily    [] Every Other Day [] Three times per week   [] Once a week [x] Do Not Change Dressing   [] Other:            Edema Control:  Apply: [] Compression Stocking []Right Leg []Left Leg   [x] Tubigrip []Right Leg Double Layer []Left Leg Double Layer       []Right Leg Single Layer [x]Left Leg Single Layer   [] SpandaGrip []Right Leg  []Left Leg      []Low compression 5-10 mm/Hg      []Medium compression 10-20 mm/Hg     []High compression  20-30 mm/Hg   every morning immediately when getting up should be applied to affected leg(s) from mid foot to knee making sure to cover the heel. Remove every night before going to bed. [x] Elevate leg(s) above the level of the heart when sitting. [x] Avoid prolonged standing in one place. Off-Loading:   [] Off-loading when [] walking  [] in bed [] sitting  [] Total non-weight bearing  [] Right Leg  [] Left Leg   [] Assistive Device [] Walker [] Cane  [] Wheelchair  [] Crutches   [x] Surgical shoe    [] Podus Boot(s)   [] Foam Boot(s)  [] Roll About    [] Cast Boot [] CROW Boot  [] Other:       Dietary:  [x] Diet as tolerated: [] Calorie Diabetic Diet: [] No Added Salt:  [] Increase Protein: [] Other:         Activity:  [x] Activity as tolerated:  [] Patient has no activity restrictions     [] Strict Bedrest: [] Remain off Work:     [] May return to full duty work:                                   [] Return to work with restrictions:             Return Appointment:  [] Wound and dressing supply provider:   [] ECF or Home Healthcare:  [] Wound Assessment: [] Physician or NP scheduled for Wound Assessment:   [x] Return Appointment: With Dr. Cary Washington  in  [address] [place] [address])  [] Ordered tests:      Electronically signed Mira Cervantes RN on 1/31/2023 at 2:00 PM    Perla Costa 281: Should you experience any significant changes in your wound(s) or have questions about your wound care, please contact the [facility] Main at [address] [place] [address] 8:00 am - 4:30. If you need help with your wound outside these hours and cannot wait until we are again available, contact your PCP or go to the hospital emergency room. PLEASE NOTE: IF YOU ARE UNABLE TO OBTAIN WOUND SUPPLIES, CONTINUE TO USE THE SUPPLIES YOU HAVE AVAILABLE UNTIL YOU ARE ABLE TO REACH US. IT IS MOST IMPORTANT TO KEEP THE WOUND COVERED AT ALL TIMES. Physician Signature:_______________________    Date: ___________ Time:  ____________

## 2023-02-07 ENCOUNTER — HOSPITAL ENCOUNTER (OUTPATIENT)
Dept: WOUND CARE | Age: 83
Discharge: HOME | End: 2023-02-07
Attending: PODIATRIST
Payer: MEDICARE

## 2023-02-07 VITALS
TEMPERATURE: 97.5 F | SYSTOLIC BLOOD PRESSURE: 142 MMHG | DIASTOLIC BLOOD PRESSURE: 70 MMHG | OXYGEN SATURATION: 97 % | RESPIRATION RATE: 18 BRPM | HEART RATE: 65 BPM

## 2023-02-07 DIAGNOSIS — I87.312 IDIOPATHIC CHRONIC VENOUS HYPERTENSION OF LEFT LOWER EXTREMITY WITH ULCER (HCC): ICD-10-CM

## 2023-02-07 DIAGNOSIS — L97.929 IDIOPATHIC CHRONIC VENOUS HYPERTENSION OF LEFT LOWER EXTREMITY WITH ULCER (HCC): ICD-10-CM

## 2023-02-07 DIAGNOSIS — T81.31XA WOUND DISRUPTION, POST-OP, SKIN, INITIAL ENCOUNTER: Primary | ICD-10-CM

## 2023-02-07 PROCEDURE — 11042 DBRDMT SUBQ TIS 1ST 20SQCM/<: CPT

## 2023-02-07 RX ORDER — BACITRACIN ZINC AND POLYMYXIN B SULFATE 500; 1000 [USP'U]/G; [USP'U]/G
OINTMENT TOPICAL ONCE
OUTPATIENT
Start: 2023-02-07 | End: 2023-02-07

## 2023-02-07 RX ORDER — LIDOCAINE 40 MG/G
CREAM TOPICAL ONCE
OUTPATIENT
Start: 2023-02-07 | End: 2023-02-07

## 2023-02-07 RX ORDER — LIDOCAINE HYDROCHLORIDE 20 MG/ML
JELLY TOPICAL ONCE
OUTPATIENT
Start: 2023-02-07 | End: 2023-02-07

## 2023-02-07 RX ORDER — LIDOCAINE HYDROCHLORIDE 40 MG/ML
SOLUTION TOPICAL ONCE
OUTPATIENT
Start: 2023-02-07 | End: 2023-02-07

## 2023-02-07 RX ORDER — LIDOCAINE HYDROCHLORIDE 20 MG/ML
JELLY TOPICAL ONCE
Status: COMPLETED | OUTPATIENT
Start: 2023-02-07 | End: 2023-02-07

## 2023-02-07 RX ORDER — LIDOCAINE 50 MG/G
OINTMENT TOPICAL ONCE
OUTPATIENT
Start: 2023-02-07 | End: 2023-02-07

## 2023-02-07 RX ORDER — CLOBETASOL PROPIONATE 0.5 MG/G
OINTMENT TOPICAL ONCE
OUTPATIENT
Start: 2023-02-07 | End: 2023-02-07

## 2023-02-07 RX ORDER — MUPIROCIN 20 MG/G
OINTMENT TOPICAL ONCE
OUTPATIENT
Start: 2023-02-07 | End: 2023-02-07

## 2023-02-07 RX ADMIN — LIDOCAINE HYDROCHLORIDE: 20 JELLY TOPICAL at 13:50

## 2023-02-07 NOTE — PROGRESS NOTES
Debridement Wound Care        Problem List Items Addressed This Visit    None      No orders of the defined types were placed in this encounter. Procedure Note  Indications:  Based on my examination of this patient's wound(s)/ulcer(s) today, debridement is required to promote healing and evaluate the wound base.     Performed by: Shannon Bey DPM    Consent obtained: Yes    Time out taken: Yes    Debridement: Excisional    Using  Curette the wound(s)/ulcer(s) was/were sharply debrided down through and including the removal of    epidermis, dermis, and subcutaneous tissue    Devitalized Tissue Debrided: fibrin, biofilm, and slough    Pre Debridement Measurements:  Are located in the [facility]  Documentation Flow Sheet    Wound/Ulcer #: 1    Post Debridement Measurements:  Wound/Ulcer Descriptions are Pre Debridement except measurements: Post-op ulcer    Wound Foot Left;Dorsal S/P Moh's Procedure 01/03/23 (Active)   Number of days: 35        Percent of Wound(s)/Ulcer(s) Debrided: 100%    Total Surface Area Debrided:  Approx 2 sq cm See RN's note    Diabetic/Pressure/Non Pressure Ulcers only: Post-op ulcer  Ulcer:     Estimated Blood Loss:  Minimal     Hemostasis Achieved: Pressure    Procedural Pain: 0 / 10     Post Procedural Pain: 0 / 10     Response to treatment: Well tolerated by patient

## 2023-02-08 NOTE — WOUND CARE
02/07/23 1714   Wound Foot Left;Dorsal S/P Moh's Procedure 01/03/23   Date First Assessed/Time First Assessed: 01/03/23 1658   Present on Hospital Admission: Yes  Wound Approximate Age at First Assessment (Weeks): 3 weeks  Primary Wound Type: Closed incision/surgical site  Location: Foot  Wound Location Orientation: Lef. ..    Wound Image     Wound Etiology Surgical   Dressing Status Intact   Cleansed Wound cleanser   Dressing/Treatment Alginate with Ag;Silicone border;Tubular bandage   Offloading for Diabetic Foot Ulcers Post-op shoe   Dressing Change Due 02/14/23   Wound Length (cm) 1 cm   Wound Width (cm) 2 cm   Wound Depth (cm) 0.1 cm   Wound Surface Area (cm^2) 2 cm^2   Change in Wound Size % 81.68   Wound Volume (cm^3) 0.2 cm^3   Wound Healing % 82   Wound Assessment Hyper granulation tissue   Drainage Amount Small   Drainage Description Serosanguinous   Wound Odor None   Radha-Wound/Incision Assessment Intact   Edges Defined edges   Wound Thickness Description Full thickness     Tubigrip size F applied

## 2023-02-21 ENCOUNTER — HOSPITAL ENCOUNTER (OUTPATIENT)
Facility: HOSPITAL | Age: 83
Discharge: HOME | End: 2023-02-21
Payer: MEDICARE

## 2023-02-21 PROCEDURE — 17250 CHEM CAUT OF GRANLTJ TISSUE: CPT

## 2023-02-21 RX ORDER — LIDOCAINE HYDROCHLORIDE 20 MG/ML
JELLY TOPICAL ONCE
OUTPATIENT
Start: 2023-02-21 | End: 2023-02-21

## 2023-02-21 RX ORDER — LIDOCAINE HYDROCHLORIDE 40 MG/ML
SOLUTION TOPICAL ONCE
OUTPATIENT
Start: 2023-02-21 | End: 2023-02-21

## 2023-02-21 RX ORDER — GINSENG 100 MG
CAPSULE ORAL ONCE
OUTPATIENT
Start: 2023-02-21 | End: 2023-02-21

## 2023-02-21 RX ORDER — CLOBETASOL PROPIONATE 0.5 MG/G
OINTMENT TOPICAL ONCE
OUTPATIENT
Start: 2023-02-21 | End: 2023-02-21

## 2023-02-21 RX ORDER — LIDOCAINE 50 MG/G
OINTMENT TOPICAL ONCE
OUTPATIENT
Start: 2023-02-21 | End: 2023-02-21

## 2023-02-21 RX ORDER — LIDOCAINE 40 MG/G
CREAM TOPICAL ONCE
OUTPATIENT
Start: 2023-02-21 | End: 2023-02-21

## 2023-02-21 RX ORDER — BETAMETHASONE DIPROPIONATE 0.05 %
OINTMENT (GRAM) TOPICAL ONCE
OUTPATIENT
Start: 2023-02-21 | End: 2023-02-21

## 2023-02-21 RX ORDER — BACITRACIN ZINC AND POLYMYXIN B SULFATE 500; 1000 [USP'U]/G; [USP'U]/G
OINTMENT TOPICAL ONCE
OUTPATIENT
Start: 2023-02-21 | End: 2023-02-21

## 2023-02-21 RX ORDER — BACITRACIN, NEOMYCIN, POLYMYXIN B 400; 3.5; 5 [USP'U]/G; MG/G; [USP'U]/G
OINTMENT TOPICAL ONCE
OUTPATIENT
Start: 2023-02-21 | End: 2023-02-21

## 2023-02-21 RX ORDER — GENTAMICIN SULFATE 1 MG/G
OINTMENT TOPICAL ONCE
OUTPATIENT
Start: 2023-02-21 | End: 2023-02-21

## 2023-02-21 NOTE — PROGRESS NOTES
Chemical Cautery  Performed by: Gaudencio Pickering DPM  Consent obtained: Yes  Time out taken: Yes  Tissue Type: Hypergranulation  Pain Control:     Method: silver nitrate    Location of Chemical Cautery:   Wound/Ulcer #1  Procedural Pain: 0  / 10   Post Procedural Pain: 0 / 10   Response to treatment: Patient tolerated procedure well with no complaints of pain  Discharged patient conditionally that if the area does not dry and if still drains she can return to the center in 2 weeks if not she is discharged from care. Patient relates understanding.

## 2023-02-23 VITALS
HEART RATE: 78 BPM | DIASTOLIC BLOOD PRESSURE: 75 MMHG | OXYGEN SATURATION: 99 % | TEMPERATURE: 97.7 F | SYSTOLIC BLOOD PRESSURE: 146 MMHG | RESPIRATION RATE: 18 BRPM

## 2023-02-23 NOTE — FLOWSHEET NOTE
02/21/23 1430   Wound 01/03/23 Foot Left;Dorsal MOHs   Date First Assessed/Time First Assessed: 01/03/23 1658   Present on Hospital Admission: Yes  Wound Approximate Age at First Assessment (Weeks): 3 weeks  Primary Wound Type: Surgical Type  Location: Foot  Wound Location Orientation: Left;Dorsal  Wound . ..    Wound Image     Wound Etiology Surgical   Dressing Status Intact   Wound Cleansed Wound cleanser   Dressing/Treatment Silicone border  (wound silver nitrated by Dr.) Sade Melo for Diabetic Foot Ulcers Offloading not required   Dressing Change Due   (pt discharged from clinic)   Wound Length (cm) 0.4 cm   Wound Width (cm) 0.4 cm   Wound Surface Area (cm^2) 0.16 cm^2   Wound Assessment Epithelialization;Pink/red   Drainage Amount None   Odor None   Margins Attached edges   Wound Follow Up   Require Follow Up No       Pt discharged

## 2023-02-23 NOTE — DISCHARGE INSTRUCTIONS
Discharge Instructions from  [facility]  [address], [facility]  [phone] Fax [phone]    NAME:  Hemanth Vaughn  YOB: 1940  MEDICAL RECORD NUMBER:  [id number]  DATE: 2/21/2023    Wound Cleansing:   Do not scrub or use excessive force. Cleanse wound prior to applying a clean dressing with:  [] Normal Saline [] Keep Wound Dry in Shower    [] Wound Cleanser   [] Cleanse wound with Mild Soap & Water  [] May Shower at Discharge   [] Other:      Topical Treatments:  Do not apply lotions, creams, or ointments to wound bed unless directed. [] Apply moisturizing lotion to skin surrounding the wound prior to dressing change.  [] Apply antifungal ointment to skin surrounding the wound prior to dressing change.  [] Apply thin film of moisture barrier ointment to skin immediately around wound. [] Other:       Dressings:           Wound Location Left foot   [] Apply Primary Dressing:       [] MediHoney Gel [] Alginate with Silver [] Alginate   [] Collagen [] Collagen with Silver   [] Santyl with Moisten saline gauze     [] Hydrocolloid   [] MediHoney Alginate [] Foam with Silver   [] Foam   [] Hydrofera Blue    [] Mepilex Border    [] Moisten with Saline [] Hydrogel [] Mepitel     [] Bactroban/Mupirocin [] Polysporin  [] Other:    [] Pack wound loosely with  [] Iodoform   [] Plain Packing  [] Other   [] Cover and Secure with:     [] Gauze [] Tristan [] Kerlix   [] Ace Wrap [] Cover Roll Tape [] ABD     [] Other:    Avoid contact of tape with skin.   [x] Change dressing: [] Daily    [] Every Other Day [] Three times per week   [] Once a week [] Do Not Change Dressing   [x] Other: Apply protective dressing to area as needed            Edema Control:  Apply: [x] Compression Stocking [x]Right Leg [x]Left Leg   [] Tubigrip []Right Leg Double Layer []Left Leg Double Layer       []Right Leg Single Layer []Left Leg Single Layer   [] SpandaGrip []Right Leg  []Left Leg      []Low compression 5-10 mm/Hg      []Medium compression 10-20 mm/Hg     []High compression  20-30 mm/Hg   every morning immediately when getting up should be applied to affected leg(s) from mid foot to knee making sure to cover the heel. Remove every night before going to bed. [] Elevate leg(s) above the level of the heart when sitting. [] Avoid prolonged standing in one place. Dietary:  [x] Diet as tolerated: [] Calorie Diabetic Diet: [] No Added Salt:  [] Increase Protein: [] Other:     Activity:  [x] Activity as tolerated:  [] Patient has no activity restrictions     [] Strict Bedrest: [] Remain off Work:     [] May return to full duty work:                                   [] Return to work with restrictions:             Return Appointment:  [] Wound and dressing supply provider:   [] ECF or Home Healthcare:  [] Wound Assessment: [] Physician or NP scheduled for Wound Assessment:   [] Return Appointment: Pt is discharged from [address] [place] [address]  [] Ordered tests:      Electronically signed Jeremy Robertson RN on 2/21/2023 at 2:30 PM    Gill Navas 281: Should you experience any significant changes in your wound(s) or have questions about your wound care, please contact the [facility] Main at [address] [place] [address] 8:00 am - 4:30. If you need help with your wound outside these hours and cannot wait until we are again available, contact your PCP or go to the hospital emergency room. PLEASE NOTE: IF YOU ARE UNABLE TO OBTAIN WOUND SUPPLIES, CONTINUE TO USE THE SUPPLIES YOU HAVE AVAILABLE UNTIL YOU ARE ABLE TO REACH US. IT IS MOST IMPORTANT TO KEEP THE WOUND COVERED AT ALL TIMES.      Physician Signature:_______________________    Date: ___________ Time:  ____________

## 2023-11-09 NOTE — PROGRESS NOTES
Debridement Wound Care        Problem List Items Addressed This Visit    None      No orders of the defined types were placed in this encounter. Procedure Note  Indications:  Based on my examination of this patient's wound(s)/ulcer(s) today, debridement is required to promote healing and evaluate the wound base. Performed by: Michael Jaime DPM    Consent obtained: Yes    Time out taken: Yes    Debridement: Excisional    Using  Curette the wound(s)/ulcer(s) was/were sharply debrided down through and including the removal of    epidermis, dermis, and subcutaneous tissue    Devitalized Tissue Debrided: fibrin, biofilm, and slough    Pre Debridement Measurements:  Are located in the [place]  Documentation Flow Sheet    Wound/Ulcer #: 1    Post Debridement Measurements:  Wound/Ulcer Descriptions are Pre Debridement except measurements: Post-op ulcer    Wound Foot Left;Dorsal S/P Moh's Procedure 01/03/23 (Active)   Wound Image   01/17/23 1345   Wound Etiology Surgical 01/17/23 1345   Dressing Status Intact 01/17/23 1345   Cleansed Wound cleanser 01/17/23 1345   Dressing/Treatment Alginate;Dry dressing;Collagen;Roll gauze; Tubular bandage 01/10/23 1440   Dressing Change Due 01/24/23 01/17/23 1345   Wound Length (cm) 2.1 cm 01/17/23 1345   Wound Width (cm) 4 cm 01/17/23 1345   Wound Depth (cm) 0.1 cm 01/17/23 1345   Wound Surface Area (cm^2) 8.4 cm^2 01/17/23 1345   Change in Wound Size % 23.08 01/17/23 1345   Wound Volume (cm^3) 0.84 cm^3 01/17/23 1345   Wound Healing % 23 01/17/23 1345   Post-Procedure Length (cm) 2.2 cm 01/10/23 1440   Post-Procedure Width (cm) 4.8 cm 01/10/23 1440   Post-Procedure Depth (cm) 0.2 cm 01/10/23 1440   Post-Procedure Surface Area (cm^2) 10.56 cm^2 01/10/23 1440   Post-Procedure Volume (cm^3) 2.112 cm^3 01/10/23 1440   Wound Assessment Devitalized tissue 01/17/23 1345   Drainage Amount Small 01/17/23 1345   Drainage Description Serosanguinous 01/17/23 1345   Wound Odor None 01/17/23 1345   Radha-Wound/Incision Assessment Intact 01/17/23 1345   Edges Defined edges 01/17/23 1345   Wound Thickness Description Full thickness 01/17/23 1345   Number of days: 21        Percent of Wound(s)/Ulcer(s) Debrided: 100%    Total Surface Area Debrided:  Approx 6 sq cm See RN's note    Diabetic/Pressure/Non Pressure Ulcers only: Post-op ulcer  Ulcer:     Estimated Blood Loss:  Minimal     Hemostasis Achieved: Silver Nitrate    Procedural Pain: 0 / 10     Post Procedural Pain: 0 / 10     Response to treatment: Well tolerated by patient 3 L/Oxygen